# Patient Record
Sex: MALE | Race: WHITE | NOT HISPANIC OR LATINO | Employment: FULL TIME | URBAN - METROPOLITAN AREA
[De-identification: names, ages, dates, MRNs, and addresses within clinical notes are randomized per-mention and may not be internally consistent; named-entity substitution may affect disease eponyms.]

---

## 2023-03-27 DIAGNOSIS — E11.9 TYPE 2 DIABETES MELLITUS WITHOUT COMPLICATION, WITHOUT LONG-TERM CURRENT USE OF INSULIN (MULTI): ICD-10-CM

## 2023-03-27 DIAGNOSIS — I10 PRIMARY HYPERTENSION: Primary | ICD-10-CM

## 2023-03-28 RX ORDER — LOSARTAN POTASSIUM 100 MG/1
100 TABLET ORAL DAILY
COMMUNITY
Start: 2018-07-13 | End: 2023-03-28 | Stop reason: SDUPTHER

## 2023-03-28 RX ORDER — METFORMIN HYDROCHLORIDE 500 MG/1
500 TABLET, EXTENDED RELEASE ORAL 2 TIMES DAILY
COMMUNITY
Start: 2018-07-13 | End: 2023-03-28 | Stop reason: SDUPTHER

## 2023-03-28 RX ORDER — EMPAGLIFLOZIN AND LINAGLIPTIN 25; 5 MG/1; MG/1
1 TABLET, FILM COATED ORAL DAILY
COMMUNITY
Start: 2018-08-23 | End: 2023-03-28 | Stop reason: SDUPTHER

## 2023-03-29 ENCOUNTER — TELEPHONE (OUTPATIENT)
Dept: PRIMARY CARE | Facility: CLINIC | Age: 68
End: 2023-03-29

## 2023-03-29 RX ORDER — LOSARTAN POTASSIUM 100 MG/1
100 TABLET ORAL DAILY
Qty: 90 TABLET | Refills: 1 | Status: SHIPPED | OUTPATIENT
Start: 2023-03-29

## 2023-03-29 RX ORDER — EMPAGLIFLOZIN AND LINAGLIPTIN 25; 5 MG/1; MG/1
1 TABLET, FILM COATED ORAL DAILY
Qty: 90 TABLET | Refills: 1 | Status: SHIPPED | OUTPATIENT
Start: 2023-03-29

## 2023-03-29 RX ORDER — METFORMIN HYDROCHLORIDE 500 MG/1
500 TABLET, EXTENDED RELEASE ORAL 2 TIMES DAILY
Qty: 90 TABLET | Refills: 1 | Status: SHIPPED | OUTPATIENT
Start: 2023-03-29

## 2023-03-29 NOTE — TELEPHONE ENCOUNTER
----- Message from Chris Lares DO sent at 3/29/2023  3:21 PM EDT -----  Regarding: Follow-up  Patient's medications refilled. Please let him know and have him scheduled for follow-up for diabetes as he has not had his Hgb A1c checked in several months. Thanks!

## 2025-07-31 ENCOUNTER — APPOINTMENT (OUTPATIENT)
Dept: CARDIOLOGY | Facility: CLINIC | Age: 70
End: 2025-07-31
Payer: MEDICARE

## 2025-07-31 VITALS
RESPIRATION RATE: 18 BRPM | OXYGEN SATURATION: 98 % | BODY MASS INDEX: 32.72 KG/M2 | DIASTOLIC BLOOD PRESSURE: 64 MMHG | SYSTOLIC BLOOD PRESSURE: 132 MMHG | WEIGHT: 203.6 LBS | HEART RATE: 102 BPM | HEIGHT: 66 IN

## 2025-07-31 DIAGNOSIS — R94.31 ABNORMAL ELECTROCARDIOGRAM (ECG) (EKG): ICD-10-CM

## 2025-07-31 DIAGNOSIS — I10 PRIMARY HYPERTENSION: Primary | ICD-10-CM

## 2025-07-31 PROCEDURE — 93000 ELECTROCARDIOGRAM COMPLETE: CPT | Performed by: INTERNAL MEDICINE

## 2025-07-31 PROCEDURE — 99205 OFFICE O/P NEW HI 60 MIN: CPT | Performed by: INTERNAL MEDICINE

## 2025-07-31 PROCEDURE — 3008F BODY MASS INDEX DOCD: CPT | Performed by: INTERNAL MEDICINE

## 2025-07-31 PROCEDURE — 3075F SYST BP GE 130 - 139MM HG: CPT | Performed by: INTERNAL MEDICINE

## 2025-07-31 PROCEDURE — 3078F DIAST BP <80 MM HG: CPT | Performed by: INTERNAL MEDICINE

## 2025-07-31 PROCEDURE — 1159F MED LIST DOCD IN RCRD: CPT | Performed by: INTERNAL MEDICINE

## 2025-07-31 RX ORDER — VALSARTAN 320 MG/1
320 TABLET ORAL DAILY
COMMUNITY
Start: 2025-01-27

## 2025-07-31 RX ORDER — AMLODIPINE BESYLATE 10 MG/1
10 TABLET ORAL DAILY
COMMUNITY
Start: 2025-02-12

## 2025-07-31 RX ORDER — AMINOPHYLLINE 25 MG/ML
125 INJECTION, SOLUTION INTRAVENOUS ONCE AS NEEDED
OUTPATIENT
Start: 2025-07-31

## 2025-07-31 RX ORDER — ATORVASTATIN CALCIUM 20 MG/1
20 TABLET, FILM COATED ORAL DAILY
Qty: 360 TABLET | Refills: 0 | Status: SHIPPED | OUTPATIENT
Start: 2025-07-31 | End: 2026-07-31

## 2025-07-31 RX ORDER — REGADENOSON 0.08 MG/ML
0.4 INJECTION, SOLUTION INTRAVENOUS
OUTPATIENT
Start: 2025-07-31

## 2025-07-31 RX ORDER — NAPROXEN SODIUM 220 MG/1
81 TABLET, FILM COATED ORAL DAILY
Qty: 90 TABLET | Refills: 3 | Status: SHIPPED | OUTPATIENT
Start: 2025-07-31 | End: 2026-07-31

## 2025-07-31 NOTE — PROGRESS NOTES
"Patient presents for cardiovascular evaluation in the [place] office at the request of JEANE Ahn for the following:    Chief Complaint:  New Patient Visit     HISTORY OF PRESENT ILLNESS:      Conrad Jansen Jr. is a 69 y.o. male with prior cardiac history of HTN, DM.  Other pertinent medical history includes L shoulder surgery.    Patient reports went for medicare wellness visit, EKG was done and he was told to see a cardiologist.    Patient decribes no chest pain.  Patient reports experiencing no weight gain, with no change over the past year.  The patient also reports mild dyspnea on exertion, no orthopnea, no nocturnal dyspnea, and no lower extremity edema.  Relatively sedentary.     No palpitations, syncope, or claudication.  No family history of CAD.  No tobacco smoking.    Past Medical History:  Medical History[1]     Past Surgical History:  Recent Surgeries in Cardiology            No cases to display              Social History:   reports that he has never smoked. He has never used smokeless tobacco. He reports that he does not drink alcohol and does not use drugs.     Family History:  family history is not on file.     Allergies:  Patient has no known allergies.    Outpatient Medications:  Current Outpatient Medications   Medication Instructions    amLODIPine (NORVASC) 10 mg, Daily    Glyxambi 25-5 mg 1 tablet, oral, Daily    losartan (COZAAR) 100 mg, oral, Daily    metFORMIN XR (GLUCOPHAGE-XR) 500 mg, oral, 2 times daily    valsartan (DIOVAN) 320 mg, Daily       ROS: 12 review of systems negative other than chief complaint    PHYSICAL EXAM    Vitals:    07/31/25 0913   BP: 132/64   BP Location: Left arm   Patient Position: Sitting   Pulse: 102   Resp: 18   SpO2: 98%   Weight: 92.4 kg (203 lb 9.6 oz)   Height: 1.676 m (5' 6\")     General: No acute distress, appears comfortable  Cardiac: Regular rate and rhythm with no murmurs rubs or gallops, normal S1-S2  Pulmonary: Clear to " "auscultation bilaterally with no rales or rhonchi, normal respiratory effort  Gastrointestinal: Soft abdomen with no tenderness or guarding, no rebound  Musculoskeletal: No lower extremity edema, normal  Neurological: Alert and oriented x 4, appropriate, moving all extremities well, normal affect  Psychiatric: Normal affect, mood appropriate to occasion  Skin: No rashes, hives or jaundice  HEENT: Normocephalic, atraumatic.  Pupils equal round and reactive.    Last Labs:    CBC -  No results found for: \"WBC\", \"HGB\", \"HCT\", \"MCV\", \"PLT\"    CMP -  Lab Results   Component Value Date    CALCIUM 10.4 (H) 01/20/2022    PROT 7.7 01/20/2022    ALBUMIN 4.7 01/20/2022    AST 29 01/20/2022    ALT 41 01/20/2022    ALKPHOS 74 01/20/2022    BILITOT 0.6 01/20/2022       LIPID PANEL -   Lab Results   Component Value Date    CHOL 151 01/20/2022    HDL 41.0 01/20/2022    CHHDL 3.7 01/20/2022    VLDL 24 01/20/2022    TRIG 119 01/20/2022       RENAL FUNCTION PANEL -   Lab Results   Component Value Date    K 4.0 01/20/2022       No results found for: \"BNP\", \"HGBA1C\"    Last Cardiology Tests: EKG today NSR possible old inferior MI, old anterior MI, nonspecific STTW changes      Assessment/Plan   {Assess/Plan SmartLinks:93059}    Assessment and plan (narrative):    Conradgabriel Jansen Jr. is a 69 y.o. male     Followup: ***    Robin Ocampo MD         [1]   Past Medical History:  Diagnosis Date    Osteogenesis imperfecta ([facility])     Osteogenesis imperfecta    Personal history of urinary calculi 08/05/2019    History of kidney stones     " microsphr (Lumason) injection 24.28 mg  -     perflutren protein A microsphere (Optison) injection 0.5 mL  -     Insert and maintain peripheral IV; Standing  -     regadenoson (Lexiscan) injection 0.4 mg  -     aminophylline injection 125 mg  -     Insert and maintain peripheral IV; Standing      Assessment and plan (narrative):    Conrad Jansen Jr. is a 69 y.o. male as above    Followup: after testing    Robin Ocampo MD         [1]   Past Medical History:  Diagnosis Date    Osteogenesis imperfecta ([facility])     Osteogenesis imperfecta    Personal history of urinary calculi 08/05/2019    History of kidney stones

## 2025-09-16 ENCOUNTER — APPOINTMENT (OUTPATIENT)
Dept: CARDIOLOGY | Facility: HOSPITAL | Age: 70
End: 2025-09-16
Payer: MEDICARE

## 2025-09-16 ENCOUNTER — APPOINTMENT (OUTPATIENT)
Dept: RADIOLOGY | Facility: HOSPITAL | Age: 70
End: 2025-09-16
Payer: MEDICARE

## 2025-09-24 ENCOUNTER — APPOINTMENT (OUTPATIENT)
Dept: CARDIOLOGY | Facility: CLINIC | Age: 70
End: 2025-09-24
Payer: MEDICARE